# Patient Record
Sex: FEMALE | URBAN - METROPOLITAN AREA
[De-identification: names, ages, dates, MRNs, and addresses within clinical notes are randomized per-mention and may not be internally consistent; named-entity substitution may affect disease eponyms.]

---

## 2020-09-02 ENCOUNTER — APPOINTMENT (RX ONLY)
Age: 75
Setting detail: DERMATOLOGY
End: 2020-09-02

## 2020-09-02 VITALS — TEMPERATURE: 97.2 F

## 2020-09-02 DIAGNOSIS — D36.1 BENIGN NEOPLASM OF PERIPHERAL NERVES AND AUTONOMIC NERVOUS SYSTEM: ICD-10-CM

## 2020-09-02 PROBLEM — D48.5 NEOPLASM OF UNCERTAIN BEHAVIOR OF SKIN: Status: ACTIVE | Noted: 2020-09-02

## 2020-09-02 PROCEDURE — 99202 OFFICE O/P NEW SF 15 MIN: CPT

## 2020-09-02 PROCEDURE — ? PATIENT SPECIFIC COUNSELING

## 2020-09-02 ASSESSMENT — LOCATION DETAILED DESCRIPTION DERM: LOCATION DETAILED: RIGHT DORSAL WRIST

## 2020-09-02 ASSESSMENT — LOCATION ZONE DERM: LOCATION ZONE: ARM

## 2020-09-02 ASSESSMENT — LOCATION SIMPLE DESCRIPTION DERM: LOCATION SIMPLE: RIGHT WRIST

## 2020-09-02 NOTE — PROCEDURE: PATIENT SPECIFIC COUNSELING
Detail Level: Detailed
**Patient wanted it removed but once she was informed that there would be a small scar she declined removal. I recommended that she monitor for changes. She declined evaluation of any other lesions/exam.